# Patient Record
Sex: FEMALE | Race: OTHER | Employment: OTHER | URBAN - METROPOLITAN AREA
[De-identification: names, ages, dates, MRNs, and addresses within clinical notes are randomized per-mention and may not be internally consistent; named-entity substitution may affect disease eponyms.]

---

## 2017-11-28 NOTE — PATIENT DISCUSSION
pt can have 2nd opinion with Dr. Gabe Arbams, Dr. Heather Arreaga, Dr. Fernando Stapleton at [facility] to tighten tendon on Both Eyes.

## 2018-01-02 NOTE — PATIENT DISCUSSION
pt can have 2nd opinion with Dr. Miguel Angel Bertrand, Dr. Simon Cloud, Dr. Lauren Hay at [facility] to tighten tendon on Both Eyes.

## 2018-01-10 NOTE — PATIENT DISCUSSION
pt can have 2nd opinion with Dr. Alvarado Hinds, Dr. Hermelinda Sales, Dr. Chao Soto at [place] [id number] to tighten tendon on Both Eyes.

## 2019-01-02 NOTE — PATIENT DISCUSSION
pt can have 2nd opinion with Dr. Pineda, Dr. Shirly Kehr, Dr. Luis Armando Jackman at [facility] to tighten tendon on Both Eyes.

## 2020-01-03 NOTE — PATIENT DISCUSSION
"pt still concerned with sig levels of glare ed only thing I see is ant capsular phimosis but I doubt an anterior YAG would sig help her this would not likely reduce glare.  IOL may have some ""scratches"" on them but this cannot be remediated. "

## 2020-01-03 NOTE — PATIENT DISCUSSION
pt can have 2nd opinion with Dr. Ellen Michaels, Dr. Isela Martinez, Dr. Mikki Mead at [facility] [place] to tighten tendon on Both Eyes.

## 2020-03-06 NOTE — PATIENT DISCUSSION
pt can have 2nd opinion with Dr. Reggie Storey, Dr. Elisha Calhoun, Dr. Jelani Bradshaw at [facility] to tighten tendon on Both Eyes.

## 2020-03-06 NOTE — PATIENT DISCUSSION
"Patient advised that she does have Phimosis OU. Advised patient that he ""spot"" she feels that she sees may be due to this issue. We could do another laser to try and get rid of it but it does come with a slight risk of the lens coming loose and leading to other complications. Do laser as last resort. "

## 2020-03-06 NOTE — PATIENT DISCUSSION
Advised patient that if she did want to be glasses free that she can have lasik enhancement again OU (PER JOD) but it would be EPI LASEK OU.

## 2020-10-16 ENCOUNTER — NEW PATIENT COMPREHENSIVE (OUTPATIENT)
Dept: URBAN - METROPOLITAN AREA CLINIC 40 | Facility: CLINIC | Age: 55
End: 2020-10-16

## 2020-10-16 DIAGNOSIS — H43.813: ICD-10-CM

## 2020-10-16 DIAGNOSIS — H52.7: ICD-10-CM

## 2020-10-16 PROCEDURE — 92004 COMPRE OPH EXAM NEW PT 1/>: CPT

## 2020-10-16 PROCEDURE — 92015 DETERMINE REFRACTIVE STATE: CPT

## 2020-10-16 ASSESSMENT — VISUAL ACUITY
OD_CC: J1
OD_SC: 20/25+2
OS_CC: J1
OS_SC: 20/30

## 2020-10-16 ASSESSMENT — TONOMETRY
OD_IOP_MMHG: 15
OS_IOP_MMHG: 15

## 2021-01-05 NOTE — PATIENT DISCUSSION
pt can have 2nd opinion with Dr. Elena Kunz, Dr. Markos Bentley, Dr. Irving Voss at [facility] [place] to tighten tendon on Both Eyes.

## 2021-01-05 NOTE — PATIENT DISCUSSION
1/5/2021:  Recommended observation unless patient wishes to see retina for eval - pt notices OS blurred/fog when outdoors playing pickle ball etc (ed this is a vitreous opacity concern).

## 2021-01-05 NOTE — PATIENT DISCUSSION
1/5/2021:  will try CTL OS for DW/outdoor activities - try toric vs sphere OS will need I and R has only worn RGP in past.

## 2021-01-13 NOTE — PATIENT DISCUSSION
pt can have 2nd opinion with Dr. Reggie Storey, Dr. Elisha Calhoun, Dr. Jelani Bradshaw at [facility] to tighten tendon on Both Eyes.

## 2021-01-26 NOTE — PATIENT DISCUSSION
pt can have 2nd opinion with Dr. Ericka Mcdermott, Dr. Jimena Quinones, Dr. Arsh Hernandez at [facility] to tighten tendon on Both Eyes.

## 2021-02-25 NOTE — PATIENT DISCUSSION
pt can have 2nd opinion with Dr. Ashish Ray, Dr. Roberta Mckeon, Dr. Everardo Guzman at [facility] [place] to tighten tendon on Both Eyes.

## 2021-03-12 NOTE — PATIENT DISCUSSION
"Patient advised that she does have Phimosis OU. Advised patient that he ""spot"" she feels that she sees may be due to this issue. We could do another laser to try and get rid of it but it does come with a slight risk of the lens coming loose and leading to other complications. Do laser as last resort. " Post-Care Instructions: I reviewed with the patient in detail post-care instructions. Patient is to wear sunprotection, and avoid picking at any of the treated lesions. Pt may apply Vaseline to crusted or scabbing areas. Render Note In Bullet Format When Appropriate: No Consent: The patient's consent was obtained including but not limited to risks of crusting, scabbing, blistering, scarring, darker or lighter pigmentary change, recurrence, incomplete removal and infection. Detail Level: Simple Duration Of Freeze Thaw-Cycle (Seconds): 0

## 2021-03-12 NOTE — PATIENT DISCUSSION
pt can have 2nd opinion with Dr. Nadia Lopez, Dr. Manan Scott, Dr. Vladimir Kinney at [facility] to tighten tendon on Both Eyes.

## 2021-03-12 NOTE — PATIENT DISCUSSION
pt can have 2nd opinion with Dr. Latasha Miles, Dr. Vidya Parker, Dr. Inna Mccarthy at [facility] to tighten tendon on Both Eyes.

## 2023-07-28 ENCOUNTER — ESTABLISHED PATIENT (OUTPATIENT)
Dept: URBAN - METROPOLITAN AREA CLINIC 40 | Facility: CLINIC | Age: 58
End: 2023-07-28

## 2023-07-28 DIAGNOSIS — H52.7: ICD-10-CM

## 2023-07-28 DIAGNOSIS — H43.813: ICD-10-CM

## 2023-07-28 PROCEDURE — 92015 DETERMINE REFRACTIVE STATE: CPT

## 2023-07-28 PROCEDURE — 92014 COMPRE OPH EXAM EST PT 1/>: CPT

## 2023-07-28 ASSESSMENT — KERATOMETRY
OS_AXISANGLE_DEGREES: 175
OS_AXISANGLE2_DEGREES: 85
OD_AXISANGLE2_DEGREES: 70
OS_K2POWER_DIOPTERS: 44.50
OD_AXISANGLE_DEGREES: 160
OD_K1POWER_DIOPTERS: 43.75
OD_K2POWER_DIOPTERS: 44.75
OS_K1POWER_DIOPTERS: 43.75

## 2023-07-28 ASSESSMENT — VISUAL ACUITY
OD_CC: J1
OS_SC: 20/50
OD_SC: 20/40+2
OD_PH: 20/20
OS_CC: J1 FUZZY
OU_SC: J12
OS_PH: 20/20
OD_SC: J12
OD_CC: 20/20
OU_CC: J1
OS_SC: J12
OU_CC: 20/20
OU_SC: 20/20-2 FUZZY
OS_CC: 20/25+1

## 2023-07-28 ASSESSMENT — TONOMETRY
OS_IOP_MMHG: 14
OD_IOP_MMHG: 16

## 2025-03-17 ENCOUNTER — COMPREHENSIVE EXAM (OUTPATIENT)
Age: 60
End: 2025-03-17

## 2025-03-17 DIAGNOSIS — H43.813: ICD-10-CM

## 2025-03-17 DIAGNOSIS — H25.813: ICD-10-CM

## 2025-03-17 PROCEDURE — 92014 COMPRE OPH EXAM EST PT 1/>: CPT

## 2025-03-17 PROCEDURE — 92015 DETERMINE REFRACTIVE STATE: CPT
